# Patient Record
Sex: FEMALE | Race: WHITE | Employment: UNEMPLOYED | URBAN - METROPOLITAN AREA
[De-identification: names, ages, dates, MRNs, and addresses within clinical notes are randomized per-mention and may not be internally consistent; named-entity substitution may affect disease eponyms.]

---

## 2024-09-30 NOTE — PROGRESS NOTES
Well Adult Note  Name: Prashanth Douglas Today’s Date: 10/1/2024   MRN: [id number] Sex: Female   Age: 34 y.o. Ethnicity: Non- / Non    : 1990 Race:  or [place]       Prashanth Douglas is here for a well adult exam.       Subjective       This patient is new to the practice and is here to establish care. The patients [place] .  We reviewed the patients allergies and current medications. We reviewed the patients medical, surgical and family history.     Acute issues:    Right side pain:  comes and goes. This week more frequent, pain increasing. Unsure if BM helps or hurts, unsure of pattern with eating, unsure if pain medication helped, had US done in past and was told gallbladder. Was in July.     LMP: 10 days ago.     Chronic issues:    N/a    Health Maintenance:    PAP:  has not had done recently offered gyn referral. She has one.     Specialists:     OBGYN:  scheduled this month.     Review of Systems   Constitutional:  Positive for activity change. Negative for appetite change, chills, diaphoresis, fatigue, fever and unexpected weight change.   HENT:  Negative for congestion, ear discharge, ear pain, hearing loss, nosebleeds, postnasal drip, rhinorrhea, sinus pressure, sinus pain, sneezing, sore throat, tinnitus, trouble swallowing and voice change.    Eyes:  Negative for photophobia, pain, discharge, redness and itching.   Respiratory:  Negative for cough, choking, chest tightness, shortness of breath, wheezing and stridor.    Cardiovascular:  Negative for chest pain, palpitations and leg swelling.   Gastrointestinal:  Positive for abdominal pain. Negative for blood in stool, constipation, diarrhea, nausea, rectal pain and vomiting.   Endocrine: Negative for cold intolerance, heat intolerance, polydipsia and polyuria.   Genitourinary:  Negative for difficulty urinating, dysuria, enuresis, flank pain, frequency, hematuria and

## 2024-10-01 ENCOUNTER — OFFICE VISIT (OUTPATIENT)
Dept: FAMILY MEDICINE CLINIC | Age: 34
End: 2024-10-01
Payer: COMMERCIAL

## 2024-10-01 VITALS
HEART RATE: 70 BPM | SYSTOLIC BLOOD PRESSURE: 96 MMHG | DIASTOLIC BLOOD PRESSURE: 63 MMHG | OXYGEN SATURATION: 97 % | WEIGHT: 149 LBS | HEIGHT: 62 IN | BODY MASS INDEX: 27.42 KG/M2

## 2024-10-01 DIAGNOSIS — Z13.29 SCREENING FOR THYROID DISORDER: ICD-10-CM

## 2024-10-01 DIAGNOSIS — Z75.8 LANGUAGE BARRIER: ICD-10-CM

## 2024-10-01 DIAGNOSIS — R10.11 RUQ PAIN: ICD-10-CM

## 2024-10-01 DIAGNOSIS — Z76.89 ESTABLISHING CARE WITH NEW DOCTOR, ENCOUNTER FOR: Primary | ICD-10-CM

## 2024-10-01 DIAGNOSIS — R10.9 RIGHT SIDED ABDOMINAL PAIN: ICD-10-CM

## 2024-10-01 DIAGNOSIS — Z00.00 PREVENTATIVE HEALTH CARE: ICD-10-CM

## 2024-10-01 DIAGNOSIS — Z60.3 LANGUAGE BARRIER: ICD-10-CM

## 2024-10-01 DIAGNOSIS — Z23 INFLUENZA VACCINE NEEDED: ICD-10-CM

## 2024-10-01 DIAGNOSIS — Z13.220 SCREENING CHOLESTEROL LEVEL: ICD-10-CM

## 2024-10-01 PROCEDURE — G8484 FLU IMMUNIZE NO ADMIN: HCPCS | Performed by: NURSE PRACTITIONER

## 2024-10-01 PROCEDURE — 99385 PREV VISIT NEW AGE 18-39: CPT | Performed by: NURSE PRACTITIONER

## 2024-10-01 PROCEDURE — 90661 CCIIV3 VAC ABX FR 0.5 ML IM: CPT | Performed by: NURSE PRACTITIONER

## 2024-10-01 SDOH — ECONOMIC STABILITY: FOOD INSECURITY: WITHIN THE PAST 12 MONTHS, THE FOOD YOU BOUGHT JUST DIDN'T LAST AND YOU DIDN'T HAVE MONEY TO GET MORE.: NEVER TRUE

## 2024-10-01 SDOH — SOCIAL STABILITY - SOCIAL INSECURITY: ACCULTURATION DIFFICULTY: Z60.3

## 2024-10-01 SDOH — ECONOMIC STABILITY: FOOD INSECURITY: WITHIN THE PAST 12 MONTHS, YOU WORRIED THAT YOUR FOOD WOULD RUN OUT BEFORE YOU GOT MONEY TO BUY MORE.: NEVER TRUE

## 2024-10-01 SDOH — ECONOMIC STABILITY: INCOME INSECURITY: HOW HARD IS IT FOR YOU TO PAY FOR THE VERY BASICS LIKE FOOD, HOUSING, MEDICAL CARE, AND HEATING?: NOT HARD AT ALL

## 2024-10-01 ASSESSMENT — ENCOUNTER SYMPTOMS
CHEST TIGHTNESS: 0
NAUSEA: 0
COUGH: 0
DIARRHEA: 0
TROUBLE SWALLOWING: 0
SORE THROAT: 0
SINUS PAIN: 0
CONSTIPATION: 0
SINUS PRESSURE: 0
COLOR CHANGE: 0
SHORTNESS OF BREATH: 0
CHOKING: 0
EYE REDNESS: 0
ABDOMINAL PAIN: 1
BACK PAIN: 0
VOICE CHANGE: 0
RECTAL PAIN: 0
RHINORRHEA: 0
STRIDOR: 0
PHOTOPHOBIA: 0
BLOOD IN STOOL: 0
EYE PAIN: 0
EYE ITCHING: 0
EYE DISCHARGE: 0
VOMITING: 0
WHEEZING: 0

## 2024-10-01 ASSESSMENT — PATIENT HEALTH QUESTIONNAIRE - PHQ9
4. FEELING TIRED OR HAVING LITTLE ENERGY: NOT AT ALL
2. FEELING DOWN, DEPRESSED OR HOPELESS: NOT AT ALL
SUM OF ALL RESPONSES TO PHQ QUESTIONS 1-9: 0
SUM OF ALL RESPONSES TO PHQ QUESTIONS 1-9: 0
1. LITTLE INTEREST OR PLEASURE IN DOING THINGS: NOT AT ALL
SUM OF ALL RESPONSES TO PHQ QUESTIONS 1-9: 0
6. FEELING BAD ABOUT YOURSELF - OR THAT YOU ARE A FAILURE OR HAVE LET YOURSELF OR YOUR FAMILY DOWN: NOT AT ALL
3. TROUBLE FALLING OR STAYING ASLEEP: NOT AT ALL
SUM OF ALL RESPONSES TO PHQ QUESTIONS 1-9: 0
5. POOR APPETITE OR OVEREATING: NOT AT ALL
7. TROUBLE CONCENTRATING ON THINGS, SUCH AS READING THE NEWSPAPER OR WATCHING TELEVISION: NOT AT ALL
8. MOVING OR SPEAKING SO SLOWLY THAT OTHER PEOPLE COULD HAVE NOTICED. OR THE OPPOSITE, BEING SO FIGETY OR RESTLESS THAT YOU HAVE BEEN MOVING AROUND A LOT MORE THAN USUAL: NOT AT ALL
10. IF YOU CHECKED OFF ANY PROBLEMS, HOW DIFFICULT HAVE THESE PROBLEMS MADE IT FOR YOU TO DO YOUR WORK, TAKE CARE OF THINGS AT HOME, OR GET ALONG WITH OTHER PEOPLE: NOT DIFFICULT AT ALL
SUM OF ALL RESPONSES TO PHQ9 QUESTIONS 1 & 2: 0
9. THOUGHTS THAT YOU WOULD BE BETTER OFF DEAD, OR OF HURTING YOURSELF: NOT AT ALL

## 2024-10-02 DIAGNOSIS — Z76.89 ESTABLISHING CARE WITH NEW DOCTOR, ENCOUNTER FOR: ICD-10-CM

## 2024-10-02 DIAGNOSIS — Z13.220 SCREENING CHOLESTEROL LEVEL: ICD-10-CM

## 2024-10-02 DIAGNOSIS — R10.9 RIGHT SIDED ABDOMINAL PAIN: ICD-10-CM

## 2024-10-02 DIAGNOSIS — Z13.29 SCREENING FOR THYROID DISORDER: ICD-10-CM

## 2024-10-02 DIAGNOSIS — Z60.3 LANGUAGE BARRIER: ICD-10-CM

## 2024-10-02 DIAGNOSIS — R10.11 RUQ PAIN: ICD-10-CM

## 2024-10-02 DIAGNOSIS — Z75.8 LANGUAGE BARRIER: ICD-10-CM

## 2024-10-02 DIAGNOSIS — Z00.00 PREVENTATIVE HEALTH CARE: ICD-10-CM

## 2024-10-02 LAB
ALBUMIN SERPL-MCNC: 4.4 G/DL (ref 3.4–5)
ALBUMIN/GLOB SERPL: 1.9 {RATIO} (ref 1.1–2.2)
ALP SERPL-CCNC: 64 U/L (ref 40–129)
ALT SERPL-CCNC: 19 U/L (ref 10–40)
ANION GAP SERPL CALCULATED.3IONS-SCNC: 12 MMOL/L (ref 3–16)
AST SERPL-CCNC: 21 U/L (ref 15–37)
BASOPHILS # BLD: 0.1 K/UL (ref 0–0.2)
BASOPHILS NFR BLD: 1.2 %
BILIRUB SERPL-MCNC: 0.4 MG/DL (ref 0–1)
BUN SERPL-MCNC: 10 MG/DL (ref 7–20)
CALCIUM SERPL-MCNC: 9.4 MG/DL (ref 8.3–10.6)
CHLORIDE SERPL-SCNC: 104 MMOL/L (ref 99–110)
CHOLEST SERPL-MCNC: 208 MG/DL (ref 0–199)
CO2 SERPL-SCNC: 23 MMOL/L (ref 21–32)
CREAT SERPL-MCNC: <0.5 MG/DL (ref 0.6–1.1)
CRP SERPL-MCNC: <3 MG/L (ref 0–5.1)
DEPRECATED RDW RBC AUTO: 13.8 % (ref 12.4–15.4)
EOSINOPHIL # BLD: 0.1 K/UL (ref 0–0.6)
EOSINOPHIL NFR BLD: 1.8 %
ERYTHROCYTE [SEDIMENTATION RATE] IN BLOOD BY WESTERGREN METHOD: 17 MM/HR (ref 0–20)
EST. AVERAGE GLUCOSE BLD GHB EST-MCNC: 91.1 MG/DL
GFR SERPLBLD CREATININE-BSD FMLA CKD-EPI: >90 ML/MIN/{1.73_M2}
GLUCOSE SERPL-MCNC: 77 MG/DL (ref 70–99)
HBA1C MFR BLD: 4.8 %
HCG UR QL: NEGATIVE
HCT VFR BLD AUTO: 38.1 % (ref 36–48)
HDLC SERPL-MCNC: 57 MG/DL (ref 40–60)
HGB BLD-MCNC: 12.4 G/DL (ref 12–16)
LDLC SERPL CALC-MCNC: 136 MG/DL
LIPASE SERPL-CCNC: 30 U/L (ref 13–60)
LYMPHOCYTES # BLD: 1.2 K/UL (ref 1–5.1)
LYMPHOCYTES NFR BLD: 25.9 %
MCH RBC QN AUTO: 29.1 PG (ref 26–34)
MCHC RBC AUTO-ENTMCNC: 32.5 G/DL (ref 31–36)
MCV RBC AUTO: 89.6 FL (ref 80–100)
MONOCYTES # BLD: 0.6 K/UL (ref 0–1.3)
MONOCYTES NFR BLD: 12 %
NEUTROPHILS # BLD: 2.8 K/UL (ref 1.7–7.7)
NEUTROPHILS NFR BLD: 59.1 %
PLATELET # BLD AUTO: 241 K/UL (ref 135–450)
PMV BLD AUTO: 9.3 FL (ref 5–10.5)
POTASSIUM SERPL-SCNC: 4.4 MMOL/L (ref 3.5–5.1)
PROT SERPL-MCNC: 6.7 G/DL (ref 6.4–8.2)
RBC # BLD AUTO: 4.25 M/UL (ref 4–5.2)
SODIUM SERPL-SCNC: 139 MMOL/L (ref 136–145)
T4 FREE SERPL-MCNC: 1.1 NG/DL (ref 0.9–1.8)
TRIGL SERPL-MCNC: 75 MG/DL (ref 0–150)
TSH SERPL DL<=0.005 MIU/L-ACNC: 3.01 UIU/ML (ref 0.27–4.2)
VLDLC SERPL CALC-MCNC: 15 MG/DL
WBC # BLD AUTO: 4.7 K/UL (ref 4–11)

## 2024-10-02 SDOH — SOCIAL STABILITY - SOCIAL INSECURITY: ACCULTURATION DIFFICULTY: Z60.3

## 2024-10-03 DIAGNOSIS — R10.11 RUQ PAIN: ICD-10-CM

## 2024-10-03 DIAGNOSIS — R10.9 RIGHT SIDED ABDOMINAL PAIN: Primary | ICD-10-CM

## 2024-10-10 ENCOUNTER — HOSPITAL ENCOUNTER (OUTPATIENT)
Dept: ULTRASOUND IMAGING | Age: 34
Discharge: HOME | End: 2024-10-10
Payer: COMMERCIAL

## 2024-10-10 DIAGNOSIS — R10.9 RIGHT SIDED ABDOMINAL PAIN: ICD-10-CM

## 2024-10-10 DIAGNOSIS — R10.11 RUQ PAIN: ICD-10-CM

## 2024-10-10 PROCEDURE — 76700 US EXAM ABDOM COMPLETE: CPT

## 2024-11-21 ENCOUNTER — HOSPITAL ENCOUNTER (OUTPATIENT)
Dept: ENDOSCOPY | Age: 34
Setting detail: OUTPATIENT SURGERY
Discharge: HOME | End: 2024-11-23
Attending: INTERNAL MEDICINE
Payer: COMMERCIAL

## 2024-11-21 ENCOUNTER — ANESTHESIA (OUTPATIENT)
Dept: ENDOSCOPY | Age: 34
End: 2024-11-21
Payer: COMMERCIAL

## 2024-11-21 ENCOUNTER — HOSPITAL ENCOUNTER (OUTPATIENT)
Age: 34
Setting detail: OUTPATIENT SURGERY
Discharge: HOME | End: 2024-11-21
Attending: INTERNAL MEDICINE | Admitting: INTERNAL MEDICINE
Payer: COMMERCIAL

## 2024-11-21 ENCOUNTER — ANESTHESIA EVENT (OUTPATIENT)
Dept: ENDOSCOPY | Age: 34
End: 2024-11-21
Payer: COMMERCIAL

## 2024-11-21 VITALS
OXYGEN SATURATION: 99 % | WEIGHT: 140 LBS | HEART RATE: 61 BPM | BODY MASS INDEX: 25.76 KG/M2 | RESPIRATION RATE: 16 BRPM | HEIGHT: 62 IN | SYSTOLIC BLOOD PRESSURE: 102 MMHG | TEMPERATURE: 97.2 F | DIASTOLIC BLOOD PRESSURE: 64 MMHG

## 2024-11-21 DIAGNOSIS — R10.11 RIGHT UPPER QUADRANT ABDOMINAL PAIN: ICD-10-CM

## 2024-11-21 LAB — HCG UR QL: NEGATIVE

## 2024-11-21 PROCEDURE — 3700000000 HC ANESTHESIA ATTENDED CARE: Performed by: INTERNAL MEDICINE

## 2024-11-21 PROCEDURE — 88342 IMHCHEM/IMCYTCHM 1ST ANTB: CPT

## 2024-11-21 PROCEDURE — 88305 TISSUE EXAM BY PATHOLOGIST: CPT

## 2024-11-21 PROCEDURE — 3609012400 HC EGD TRANSORAL BIOPSY SINGLE/MULTIPLE: Performed by: INTERNAL MEDICINE

## 2024-11-21 PROCEDURE — 7100000011 HC PHASE II RECOVERY - ADDTL 15 MIN: Performed by: INTERNAL MEDICINE

## 2024-11-21 PROCEDURE — 2709999900 HC NON-CHARGEABLE SUPPLY: Performed by: INTERNAL MEDICINE

## 2024-11-21 PROCEDURE — 7100000010 HC PHASE II RECOVERY - FIRST 15 MIN: Performed by: INTERNAL MEDICINE

## 2024-11-21 PROCEDURE — 6360000002 HC RX W HCPCS: Performed by: NURSE ANESTHETIST, CERTIFIED REGISTERED

## 2024-11-21 PROCEDURE — 84703 CHORIONIC GONADOTROPIN ASSAY: CPT

## 2024-11-21 RX ORDER — SODIUM CHLORIDE 0.9 % (FLUSH) 0.9 %
5-40 SYRINGE (ML) INJECTION PRN
Status: DISCONTINUED | OUTPATIENT
Start: 2024-11-21 | End: 2024-11-21 | Stop reason: HOSPADM

## 2024-11-21 RX ORDER — SODIUM CHLORIDE 0.9 % (FLUSH) 0.9 %
5-40 SYRINGE (ML) INJECTION EVERY 12 HOURS SCHEDULED
Status: DISCONTINUED | OUTPATIENT
Start: 2024-11-21 | End: 2024-11-21 | Stop reason: HOSPADM

## 2024-11-21 RX ORDER — PROPOFOL 10 MG/ML
INJECTION, EMULSION INTRAVENOUS
Status: DISCONTINUED | OUTPATIENT
Start: 2024-11-21 | End: 2024-11-21 | Stop reason: SDUPTHER

## 2024-11-21 RX ORDER — MIDAZOLAM HYDROCHLORIDE 1 MG/ML
INJECTION, SOLUTION INTRAMUSCULAR; INTRAVENOUS
Status: DISCONTINUED | OUTPATIENT
Start: 2024-11-21 | End: 2024-11-21 | Stop reason: SDUPTHER

## 2024-11-21 RX ORDER — SODIUM CHLORIDE, SODIUM LACTATE, POTASSIUM CHLORIDE, CALCIUM CHLORIDE 600; 310; 30; 20 MG/100ML; MG/100ML; MG/100ML; MG/100ML
INJECTION, SOLUTION INTRAVENOUS CONTINUOUS
Status: DISCONTINUED | OUTPATIENT
Start: 2024-11-21 | End: 2024-11-21 | Stop reason: HOSPADM

## 2024-11-21 RX ORDER — PANTOPRAZOLE SODIUM 40 MG/1
40 TABLET, DELAYED RELEASE ORAL DAILY
COMMUNITY

## 2024-11-21 RX ORDER — LIDOCAINE HYDROCHLORIDE 10 MG/ML
1 INJECTION, SOLUTION EPIDURAL; INFILTRATION; INTRACAUDAL; PERINEURAL
Status: DISCONTINUED | OUTPATIENT
Start: 2024-11-21 | End: 2024-11-21 | Stop reason: HOSPADM

## 2024-11-21 RX ORDER — LIDOCAINE HYDROCHLORIDE 20 MG/ML
INJECTION, SOLUTION INTRAVENOUS
Status: DISCONTINUED | OUTPATIENT
Start: 2024-11-21 | End: 2024-11-21 | Stop reason: SDUPTHER

## 2024-11-21 RX ADMIN — PROPOFOL 40 MG: 10 INJECTION, EMULSION INTRAVENOUS at 12:58

## 2024-11-21 RX ADMIN — MIDAZOLAM HYDROCHLORIDE 2 MG: 2 INJECTION, SOLUTION INTRAMUSCULAR; INTRAVENOUS at 12:46

## 2024-11-21 RX ADMIN — LIDOCAINE HYDROCHLORIDE 50 MG: 20 INJECTION, SOLUTION INTRAVENOUS at 12:49

## 2024-11-21 RX ADMIN — PROPOFOL 100 MG: 10 INJECTION, EMULSION INTRAVENOUS at 12:52

## 2024-11-21 ASSESSMENT — PAIN - FUNCTIONAL ASSESSMENT: PAIN_FUNCTIONAL_ASSESSMENT: 0-10

## 2024-11-21 ASSESSMENT — PAIN SCALES - GENERAL: PAINLEVEL_OUTOF10: 0

## 2024-11-21 NOTE — PROCEDURES
PROCEDURE NOTE  Date: 2024   Name: Prashanth Douglas  YOB: 1990    Procedures  EGD, biopsy                Endoscopy Note    Patient: Prashanth Douglas  : 1990  Acct#:     Procedure: Esophagogastroduodenoscopy with biopsy    Date:  2024     Surgeon:  Brenton Gonzalez MD,     Referring Physician:  Nika Oconnell, APRN - CNP      Preoperative Diagnosis:    34-year-old female presents with epigastric/right upper quadrant pain.  Ultrasound abdominal was unremarkable      Anesthesia: MAC anesthesia      Consent:  The patient or their legal guardian has signed an informed consent, and is aware of the potential risks, benefits, alternatives, and potential complications of this procedure.  These include, but are not limited to hemorrhage, bleeding, post procedural pain, perforation, phlebitis, aspiration, hypotension, hypoxia, cardiovascular events such as arryhthmia, and possibly death.     Description of Procedure: The patient was then taken to the endoscopy suite, placed in the left lateral decubitus position and the above IV sedation was administrered.  The Olympus video endoscope was placed through the patient's oropharynx without difficulty to the extent of the 2nd portion of the duodenum. The patient tolerated the procedure well and was taken to the post anesthesia care unit in good condition.    Complications: None  EBL: none      Findings:  Esophagus:  Visualization of the esophagus demonstrated normal mucosa.  GE junction normal at 38 cm.     Stomach:  The scope was then advanced into the stomach.  Both forward and retroflexed views of the stomach were obtained.  Visualization of the gastric body and antrum demonstrated normal-appearing mucosa, biopsy obtained.  A retroflexed exam of the gastric cardia and fundus demonstrated normal mucosa.  The pylorus was patent and the scope was advanced into the duodenum.  Duodenum: Visualization of the duodenal bulb and the

## 2024-11-21 NOTE — ANESTHESIA PRE PROCEDURE
intravenous.      Anesthetic plan and risks discussed with patient.      Plan discussed with CRNA.                    Anish Graham MD   11/21/2024

## 2024-11-21 NOTE — DISCHARGE INSTRUCTIONS
ENDOSCOPY DISCHARGE INSTRUCTIONS:    Call the physician that did your procedure for any questions or concern:    [facility]: [phone]  DR. BENITEZ SHER        ACTIVITY:    There are potential side effects to the medications used for sedation and anesthesia during your procedure.  These include:  Dizziness or light-headedness, confusion or memory loss, delayed reaction times, loss of coordination, nausea and vomiting.  Because of your increased risk for injury, we ask that you observe the following precautions:  For the next 24 hours,  DO NOT operate an automobile, bicycle, motorcycle, , power tools or large equipment of any kind.  Do not drink alcohol, sign any legal documents or make any legal decisions for 24 hours.  Do not bend your head over lower than your heart.  DO sit on the side of bed/couch awhile before getting up.  Plan on bedrest or quiet relaxation today.  You may resume normal activities in 24 hours.    DIET:    Your first meal today should be light, avoiding spicy and fatty foods.  If you tolerate this first meal, then you may advance to your regular diet unless otherwise advised by your physician.    NORMAL SYMPTOMS:  -Mild sore throat if you’ve had an EGD   -Gaseous discomfort    NOTIFY YOUR PHYSICIAN IF THESE SYMPTOMS OCCUR:  1. Fever (greater than 100)  5. Increased abdominal bloating  2. Severe pain    6. Excessive bleeding  3. Nausea and vomiting  7. Chest pain                                                                    4. Chills    8. Shortness of breath    ADDITIONAL INSTRUCTIONS:    Biopsy results: Call [facility] for biopsy results in 1 week    Educational Information:    Impression:    Unremarkable EGD exam        Recommendations:   Follow pathology results  Continue pantoprazole  Follow-up in the office in 6 to 8 weeks/as needed      Please review these discharge instructions this evening or tomorrow for  information you may have forgotten.            We

## 2024-11-21 NOTE — PROGRESS NOTES
Ambulatory Surgery/Procedure Discharge Note    Vitals:    11/21/24 1353   BP: 102/64   Pulse: 61   Resp: 16   Temp:    SpO2: 99%   Discharge criteria met per Chano score. Dr. Gonzalez saw patient and  after procedure.     In: 120 [P.O.:120]  Out: -     Restroom use offered before discharge.  Yes    Pain assessment:  none  Pain Level: 0    Patient discharged to home/self care. Patient discharged via wheel chair by transporter to waiting family/S.O.       11/21/2024 1:57 PMPt and S.O./family states \"ready to go home\". Pt alert and oriented x4. IV removed. Denies N/V or pain. Pt tolerating po intake. Discharge instructions given to pt and  with pt permission.  used. Pt and  verbalized understanding of all instructions. Left with all belongings and discharge instructions.

## 2024-11-21 NOTE — ANESTHESIA POSTPROCEDURE EVALUATION
Department of Anesthesiology  Postprocedure Note    Patient: Prashanth Douglas  MRN: [id number]  YOB: 1990  Date of evaluation: 11/21/2024    Procedure Summary       Date: 11/21/24 Room / Location: [place] [address] / [facility]    Anesthesia Start: 1246 Anesthesia Stop: 1303    Procedure: ESOPHAGOGASTRODUODENOSCOPY BIOPSY Diagnosis:       Right upper quadrant abdominal pain      (Right upper quadrant abdominal pain [R10.11])    Surgeons: Brenton Gonzalez MD Responsible Provider: Anish Graham MD    Anesthesia Type: MAC ASA Status: 1            Anesthesia Type: No value filed.    Chano Phase I: Chano Score: 10    Chano Phase II: Chano Score: 10    Anesthesia Post Evaluation    Patient location during evaluation: PACU  Patient participation: complete - patient participated  Level of consciousness: awake and alert  Airway patency: patent  Nausea & Vomiting: no nausea and no vomiting  Cardiovascular status: blood pressure returned to baseline  Respiratory status: acceptable  Hydration status: euvolemic    No notable events documented.

## 2024-11-21 NOTE — CONSULTS
Session ID: [id number]  Language: Neptali   ID: #[id number]   Name: Wisamge: Neptali   ID: #[id number]   Name: Tiffani

## 2024-11-21 NOTE — H&P
Pre-operative History and Physical    Patient: Prashanth Douglas  : 1990  Acct#:     History Obtained From:  patient    HISTORY OF PRESENT ILLNESS:    The patient is a 34 y.o. female who presents with epigastric/right upper quadrant pain    Past Medical History:    History reviewed. No pertinent past medical history.  Past Surgical History:        Procedure Laterality Date     SECTION      x 2     Medications Prior to Admission:   No current facility-administered medications on file prior to encounter.     Current Outpatient Medications on File Prior to Encounter   Medication Sig Dispense Refill    pantoprazole (PROTONIX) 40 MG tablet Take 1 tablet by mouth daily       Allergies:  Patient has no known allergies.    History of allergic reaction to anesthesia:  No      PHYSICAL EXAM:      BP (!) 106/52   Pulse 73   Temp 97.7 °F (36.5 °C) (Temporal)   Resp 14   Ht 1.575 m (5' 2\")   Wt 63.5 kg (140 lb)   SpO2 100%   BMI 25.61 kg/m²  I        Heart:  heart sounds normal  Lungs: Breath sounds clear  Abdomen:  soft, non tender      ASA Grade:  ASA 2 - Patient with mild systemic disease with no functional limitations      ASSESSMENT AND PLAN:    1.  Patient is a 34 y.o. female here for EGD with deep sedation  2.  Procedure options, risks and benefits reviewed with patient.  Patient expresses understanding.          Antony Gonzalez MD  [facility]  [phone]

## 2024-12-02 NOTE — PROGRESS NOTES
Chief Complaint   Patient presents with    Rash     Welts on legs 4 days ABX given by Gastro        BP 96/62   Pulse 74   Ht 1.575 m (5' 2\")   Wt 69.9 kg (154 lb)   SpO2 98%   BMI 28.17 kg/m²     HPI:  Prashanth Douglas is a 34 y.o. (: 1990) here today   for   HPI    Patient's medications, allergies, past medical, surgical, social and family histories were reviewed and updated as appropriate.    Rash/skin lesions : on bilateral shins, red, slightly swollen, hard a little slightly itching, first noticed them 4 days ago. Hard under surface. Will get US and refer to derm. She is on an ab from GI, Clarithromycin and biaxin.         ROS:  Review of Systems   Constitutional:  Negative for activity change, appetite change, chills, diaphoresis, fatigue, fever and unexpected weight change.   HENT:  Negative for congestion, ear discharge, ear pain, hearing loss, nosebleeds, postnasal drip, rhinorrhea, sinus pressure, sinus pain, sneezing, sore throat, tinnitus, trouble swallowing and voice change.    Eyes:  Negative for photophobia, pain, discharge, redness and itching.   Respiratory:  Negative for cough, choking, chest tightness, shortness of breath, wheezing and stridor.    Cardiovascular:  Negative for chest pain, palpitations and leg swelling.   Gastrointestinal:  Negative for abdominal pain, blood in stool, constipation, diarrhea, nausea and vomiting.   Endocrine: Negative for cold intolerance, heat intolerance, polydipsia and polyuria.   Genitourinary:  Negative for difficulty urinating, dysuria, enuresis, flank pain, frequency, hematuria and urgency.   Musculoskeletal:  Negative for back pain, gait problem, joint swelling, neck pain and neck stiffness.   Skin:  Positive for color change and rash. Negative for pallor and wound.   Allergic/Immunologic: Negative for environmental allergies and food allergies.   Neurological:  Negative for dizziness, tremors, syncope, speech difficulty, weakness,

## 2024-12-03 ENCOUNTER — OFFICE VISIT (OUTPATIENT)
Dept: FAMILY MEDICINE CLINIC | Age: 34
End: 2024-12-03
Payer: COMMERCIAL

## 2024-12-03 VITALS
SYSTOLIC BLOOD PRESSURE: 96 MMHG | OXYGEN SATURATION: 98 % | WEIGHT: 154 LBS | DIASTOLIC BLOOD PRESSURE: 62 MMHG | BODY MASS INDEX: 28.34 KG/M2 | HEART RATE: 74 BPM | HEIGHT: 62 IN

## 2024-12-03 DIAGNOSIS — Z60.3 LANGUAGE BARRIER: ICD-10-CM

## 2024-12-03 DIAGNOSIS — Z75.8 LANGUAGE BARRIER: ICD-10-CM

## 2024-12-03 DIAGNOSIS — L98.9 SKIN LESIONS: Primary | ICD-10-CM

## 2024-12-03 PROCEDURE — G8427 DOCREV CUR MEDS BY ELIG CLIN: HCPCS | Performed by: NURSE PRACTITIONER

## 2024-12-03 PROCEDURE — G8484 FLU IMMUNIZE NO ADMIN: HCPCS | Performed by: NURSE PRACTITIONER

## 2024-12-03 PROCEDURE — 1036F TOBACCO NON-USER: CPT | Performed by: NURSE PRACTITIONER

## 2024-12-03 PROCEDURE — G8419 CALC BMI OUT NRM PARAM NOF/U: HCPCS | Performed by: NURSE PRACTITIONER

## 2024-12-03 PROCEDURE — 99213 OFFICE O/P EST LOW 20 MIN: CPT | Performed by: NURSE PRACTITIONER

## 2024-12-03 RX ORDER — CLARITHROMYCIN 500 MG/1
500 TABLET ORAL 2 TIMES DAILY
COMMUNITY
Start: 2024-11-25

## 2024-12-03 RX ORDER — AMOXICILLIN 500 MG/1
500 CAPSULE ORAL 2 TIMES DAILY
COMMUNITY
Start: 2024-11-25

## 2024-12-03 SDOH — SOCIAL STABILITY - SOCIAL INSECURITY: ACCULTURATION DIFFICULTY: Z60.3

## 2024-12-03 ASSESSMENT — ENCOUNTER SYMPTOMS
VOICE CHANGE: 0
CHOKING: 0
COLOR CHANGE: 1
VOMITING: 0
EYE REDNESS: 0
COUGH: 0
SORE THROAT: 0
PHOTOPHOBIA: 0
EYE DISCHARGE: 0
BLOOD IN STOOL: 0
CONSTIPATION: 0
STRIDOR: 0
SINUS PAIN: 0
SINUS PRESSURE: 0
NAUSEA: 0
TROUBLE SWALLOWING: 0
SHORTNESS OF BREATH: 0
RHINORRHEA: 0
DIARRHEA: 0
EYE PAIN: 0
EYE ITCHING: 0
BACK PAIN: 0
ABDOMINAL PAIN: 0
CHEST TIGHTNESS: 0
WHEEZING: 0

## 2024-12-06 ENCOUNTER — HOSPITAL ENCOUNTER (OUTPATIENT)
Dept: ULTRASOUND IMAGING | Age: 34
Discharge: HOME | End: 2024-12-06
Payer: COMMERCIAL

## 2024-12-06 DIAGNOSIS — L98.9 SKIN LESIONS: ICD-10-CM

## 2024-12-06 PROCEDURE — 76999 ECHO EXAMINATION PROCEDURE: CPT

## 2024-12-09 ENCOUNTER — OFFICE VISIT (OUTPATIENT)
Dept: FAMILY MEDICINE CLINIC | Age: 34
End: 2024-12-09
Payer: COMMERCIAL

## 2024-12-09 VITALS
OXYGEN SATURATION: 97 % | WEIGHT: 152 LBS | BODY MASS INDEX: 27.97 KG/M2 | SYSTOLIC BLOOD PRESSURE: 98 MMHG | HEART RATE: 94 BPM | DIASTOLIC BLOOD PRESSURE: 64 MMHG | HEIGHT: 62 IN

## 2024-12-09 DIAGNOSIS — L98.9 SKIN LESIONS: Primary | ICD-10-CM

## 2024-12-09 DIAGNOSIS — Z75.8 LANGUAGE BARRIER: ICD-10-CM

## 2024-12-09 DIAGNOSIS — Z60.3 LANGUAGE BARRIER: ICD-10-CM

## 2024-12-09 DIAGNOSIS — L98.9 SKIN LESIONS: ICD-10-CM

## 2024-12-09 DIAGNOSIS — M25.59 PAIN IN OTHER JOINT: ICD-10-CM

## 2024-12-09 LAB
CRP SERPL-MCNC: 5.3 MG/L (ref 0–5.1)
URATE SERPL-MCNC: 2.8 MG/DL (ref 2.6–6)

## 2024-12-09 PROCEDURE — G8419 CALC BMI OUT NRM PARAM NOF/U: HCPCS | Performed by: NURSE PRACTITIONER

## 2024-12-09 PROCEDURE — 1036F TOBACCO NON-USER: CPT | Performed by: NURSE PRACTITIONER

## 2024-12-09 PROCEDURE — G8484 FLU IMMUNIZE NO ADMIN: HCPCS | Performed by: NURSE PRACTITIONER

## 2024-12-09 PROCEDURE — G8427 DOCREV CUR MEDS BY ELIG CLIN: HCPCS | Performed by: NURSE PRACTITIONER

## 2024-12-09 PROCEDURE — 99213 OFFICE O/P EST LOW 20 MIN: CPT | Performed by: NURSE PRACTITIONER

## 2024-12-09 SDOH — SOCIAL STABILITY - SOCIAL INSECURITY: ACCULTURATION DIFFICULTY: Z60.3

## 2024-12-09 ASSESSMENT — ENCOUNTER SYMPTOMS
DIARRHEA: 0
CHOKING: 0
COLOR CHANGE: 1
EYE PAIN: 0
BACK PAIN: 0
SINUS PAIN: 0
NAUSEA: 0
CONSTIPATION: 0
STRIDOR: 0
COUGH: 0
BLOOD IN STOOL: 0
SINUS PRESSURE: 0
EYE DISCHARGE: 0
VOICE CHANGE: 0
VOMITING: 0
CHEST TIGHTNESS: 0
ABDOMINAL PAIN: 0
PHOTOPHOBIA: 0
RHINORRHEA: 0
SORE THROAT: 0
WHEEZING: 0
TROUBLE SWALLOWING: 0
EYE ITCHING: 0
EYE REDNESS: 0
SHORTNESS OF BREATH: 0

## 2024-12-09 NOTE — PROGRESS NOTES
No chief complaint on file.      BP 98/64   Pulse 94   Ht 1.575 m (5' 2\")   Wt 68.9 kg (152 lb)   SpO2 97%   BMI 27.80 kg/m²     HPI:  Prashanth Douglas is a 34 y.o. (: 1990) here today   for   HPI     used.     Patient's medications, allergies, past medical, surgical, social and family histories were reviewed and updated as appropriate.    Pt wanted to review US results.     Leg lesions: US negative, recommended follow up with Derm. Referral placed prior. Will reprint. She was taking magnesium but I dont believe this is the cause. She is getting new lesions. Will get autoimmune labs. Lesions are tender to touch. Pt has been having joint pain prior to lesions appearing. Will get labs.     ROS:  Review of Systems   Constitutional:  Negative for activity change, appetite change, chills, diaphoresis, fatigue, fever and unexpected weight change.   HENT:  Negative for congestion, ear discharge, ear pain, hearing loss, nosebleeds, postnasal drip, rhinorrhea, sinus pressure, sinus pain, sneezing, sore throat, tinnitus, trouble swallowing and voice change.    Eyes:  Negative for photophobia, pain, discharge, redness and itching.   Respiratory:  Negative for cough, choking, chest tightness, shortness of breath, wheezing and stridor.    Cardiovascular:  Negative for chest pain, palpitations and leg swelling.   Gastrointestinal:  Negative for abdominal pain, blood in stool, constipation, diarrhea, nausea and vomiting.   Endocrine: Negative for cold intolerance, heat intolerance, polydipsia and polyuria.   Genitourinary:  Negative for difficulty urinating, dysuria, enuresis, flank pain, frequency, hematuria and urgency.   Musculoskeletal:  Negative for back pain, gait problem, joint swelling, neck pain and neck stiffness.   Skin:  Positive for color change. Negative for pallor, rash and wound.        Hard lesions   Allergic/Immunologic: Negative for environmental allergies and food allergies.

## 2024-12-10 ENCOUNTER — OFFICE VISIT (OUTPATIENT)
Age: 34
End: 2024-12-10
Payer: COMMERCIAL

## 2024-12-10 VITALS
BODY MASS INDEX: 27.59 KG/M2 | OXYGEN SATURATION: 100 % | SYSTOLIC BLOOD PRESSURE: 92 MMHG | TEMPERATURE: 98.2 F | WEIGHT: 149.91 LBS | DIASTOLIC BLOOD PRESSURE: 56 MMHG | HEART RATE: 74 BPM | HEIGHT: 62 IN

## 2024-12-10 DIAGNOSIS — R22.42 NODULE OF SKIN OF LEFT LOWER EXTREMITY: Primary | ICD-10-CM

## 2024-12-10 LAB
ANA SER QL IA: NEGATIVE
ERYTHROCYTE [SEDIMENTATION RATE] IN BLOOD BY WESTERGREN METHOD: 15 MM/HR (ref 0–20)

## 2024-12-10 PROCEDURE — G8419 CALC BMI OUT NRM PARAM NOF/U: HCPCS | Performed by: SURGERY

## 2024-12-10 PROCEDURE — 99203 OFFICE O/P NEW LOW 30 MIN: CPT | Performed by: SURGERY

## 2024-12-10 PROCEDURE — G8427 DOCREV CUR MEDS BY ELIG CLIN: HCPCS | Performed by: SURGERY

## 2024-12-10 PROCEDURE — 1036F TOBACCO NON-USER: CPT | Performed by: SURGERY

## 2024-12-10 PROCEDURE — G8484 FLU IMMUNIZE NO ADMIN: HCPCS | Performed by: SURGERY

## 2024-12-10 NOTE — PROGRESS NOTES
Subjective   Patient ID: Prashanth Douglas is a 34 y.o. female.    HPI  Chief Complaint   Patient presents with    New Patient     Skin lesions on legs     Patient referred by Nika Oconnell CNP for evaluation of leg nodules. Patient reports symptoms of pain, heaviness, swelling. Location of symptoms is BLE. Symptoms were first noted one week ago.   Alleviated by Motrin.  She put Betadine on it yesterday without relief.  Symptoms aggravated by prolonged activity as worse at the end of the day.  Denies fever, chills, viral symptoms better, insect bite. CRP only minimally elevated at 5.3.  Also reports pain and heaviness in the bilateral upper extremities.  Previous evaluation includes ultrasound that was unremarkable as well.  On review of chart she was started on abx per GI as well but appears to be for H. pylori.  Patient has no significant medical or surgical history. Will plan following treatment: compression/elevation.        No past medical history on file.    Past Surgical History:   Procedure Laterality Date     SECTION      x 2    UPPER GASTROINTESTINAL ENDOSCOPY N/A 2024    ESOPHAGOGASTRODUODENOSCOPY BIOPSY performed by Brenton Gonzalez MD at [facility] ENDOSCOPY       Current Outpatient Medications   Medication Sig Dispense Refill    pantoprazole (PROTONIX) 40 MG tablet Take 1 tablet by mouth daily       No current facility-administered medications for this visit.       Prior to Admission medications    Medication Sig Start Date End Date Taking? Authorizing Provider   pantoprazole (PROTONIX) 40 MG tablet Take 1 tablet by mouth daily    Provider, MD Mina         No Known Allergies    Social History     Socioeconomic History    Marital status:      Spouse name: Not on file    Number of children: Not on file    Years of education: Not on file    Highest education level: Not on file   Occupational History    Not on file   Tobacco Use    Smoking status: Never    Smokeless

## 2024-12-11 ENCOUNTER — OFFICE VISIT (OUTPATIENT)
Dept: FAMILY MEDICINE CLINIC | Age: 34
End: 2024-12-11
Payer: COMMERCIAL

## 2024-12-11 VITALS
WEIGHT: 146 LBS | OXYGEN SATURATION: 97 % | HEART RATE: 78 BPM | BODY MASS INDEX: 26.7 KG/M2 | DIASTOLIC BLOOD PRESSURE: 60 MMHG | SYSTOLIC BLOOD PRESSURE: 100 MMHG

## 2024-12-11 DIAGNOSIS — Z60.3 LANGUAGE BARRIER: ICD-10-CM

## 2024-12-11 DIAGNOSIS — Z75.8 LANGUAGE BARRIER: ICD-10-CM

## 2024-12-11 DIAGNOSIS — M25.50 ARTHRALGIA, UNSPECIFIED JOINT: ICD-10-CM

## 2024-12-11 DIAGNOSIS — L98.9 SKIN LESION: Primary | ICD-10-CM

## 2024-12-11 PROCEDURE — G8484 FLU IMMUNIZE NO ADMIN: HCPCS | Performed by: NURSE PRACTITIONER

## 2024-12-11 PROCEDURE — 99213 OFFICE O/P EST LOW 20 MIN: CPT | Performed by: NURSE PRACTITIONER

## 2024-12-11 PROCEDURE — G8419 CALC BMI OUT NRM PARAM NOF/U: HCPCS | Performed by: NURSE PRACTITIONER

## 2024-12-11 PROCEDURE — 1036F TOBACCO NON-USER: CPT | Performed by: NURSE PRACTITIONER

## 2024-12-11 PROCEDURE — G8427 DOCREV CUR MEDS BY ELIG CLIN: HCPCS | Performed by: NURSE PRACTITIONER

## 2024-12-11 RX ORDER — CETIRIZINE HYDROCHLORIDE 10 MG/1
10 TABLET ORAL DAILY
Qty: 30 TABLET | Refills: 0 | Status: SHIPPED | OUTPATIENT
Start: 2024-12-11

## 2024-12-11 SDOH — SOCIAL STABILITY - SOCIAL INSECURITY: ACCULTURATION DIFFICULTY: Z60.3

## 2024-12-11 ASSESSMENT — ENCOUNTER SYMPTOMS
CONSTIPATION: 0
BLOOD IN STOOL: 0
ROS SKIN COMMENTS: SKIN LESIONS
DIARRHEA: 0
EYE DISCHARGE: 0
STRIDOR: 0
EYE ITCHING: 0
BACK PAIN: 0
SINUS PAIN: 0
NAUSEA: 0
WHEEZING: 0
VOMITING: 0
EYE REDNESS: 0
VOICE CHANGE: 0
TROUBLE SWALLOWING: 0
PHOTOPHOBIA: 0
CHEST TIGHTNESS: 0
SINUS PRESSURE: 0
ABDOMINAL PAIN: 0
EYE PAIN: 0
CHOKING: 0
COUGH: 0
SORE THROAT: 0
RHINORRHEA: 0
SHORTNESS OF BREATH: 0
COLOR CHANGE: 0

## 2024-12-11 NOTE — PROGRESS NOTES
Chief Complaint   Patient presents with    Follow-up     Go over test results.       used.     /60 (Site: Left Upper Arm)   Pulse 78   Wt 66.2 kg (146 lb)   SpO2 97%   BMI 26.70 kg/m²     HPI:  Prashanth Douglas is a 34 y.o. (: 1990) here today   for   HPI    Patient's medications, allergies, past medical, surgical, social and family histories were reviewed and updated as appropriate.    Lab results: Patient had 1 inflammatory lab positive but the rest were negative.  Uric acid negative as well    General surgeon: Discussed with general surgeon yesterday lesion seemed more bite-like.  Resolved significantly when he saw her in office yesterday.  Will continue to monitor.  He advised to watch for about a week and follow-up with me if they are not resolving.    Skin lesions: changing, darker and some new spots as well. Still hurts and tender. Starting to almost appear bruised. Lesion was hard now softer.         ROS:  Review of Systems   Constitutional:  Negative for activity change, appetite change, chills, diaphoresis, fatigue, fever and unexpected weight change.   HENT:  Negative for congestion, ear discharge, ear pain, hearing loss, nosebleeds, postnasal drip, rhinorrhea, sinus pressure, sinus pain, sneezing, sore throat, tinnitus, trouble swallowing and voice change.    Eyes:  Negative for photophobia, pain, discharge, redness and itching.   Respiratory:  Negative for cough, choking, chest tightness, shortness of breath, wheezing and stridor.    Cardiovascular:  Negative for chest pain, palpitations and leg swelling.   Gastrointestinal:  Negative for abdominal pain, blood in stool, constipation, diarrhea, nausea and vomiting.   Endocrine: Negative for cold intolerance, heat intolerance, polydipsia and polyuria.   Genitourinary:  Negative for difficulty urinating, dysuria, enuresis, flank pain, frequency, hematuria and urgency.   Musculoskeletal:  Negative for back pain, gait

## 2024-12-18 ENCOUNTER — OFFICE VISIT (OUTPATIENT)
Dept: FAMILY MEDICINE CLINIC | Age: 34
End: 2024-12-18

## 2024-12-18 VITALS
SYSTOLIC BLOOD PRESSURE: 96 MMHG | HEIGHT: 65 IN | OXYGEN SATURATION: 98 % | DIASTOLIC BLOOD PRESSURE: 64 MMHG | BODY MASS INDEX: 25.49 KG/M2 | WEIGHT: 153 LBS | HEART RATE: 77 BPM

## 2024-12-18 DIAGNOSIS — Z75.8 LANGUAGE BARRIER: ICD-10-CM

## 2024-12-18 DIAGNOSIS — M25.50 ARTHRALGIA, UNSPECIFIED JOINT: ICD-10-CM

## 2024-12-18 DIAGNOSIS — Z60.3 LANGUAGE BARRIER: ICD-10-CM

## 2024-12-18 DIAGNOSIS — L52 ERYTHEMA NODOSUM: Primary | ICD-10-CM

## 2024-12-18 DIAGNOSIS — M89.8X9 BONE PAIN: ICD-10-CM

## 2024-12-18 RX ORDER — METHYLPREDNISOLONE 4 MG/1
TABLET ORAL
Qty: 1 KIT | Refills: 0 | Status: SHIPPED | OUTPATIENT
Start: 2024-12-18 | End: 2024-12-18

## 2024-12-18 RX ORDER — METHYLPREDNISOLONE 4 MG/1
TABLET ORAL
Qty: 1 KIT | Refills: 0 | Status: SHIPPED | OUTPATIENT
Start: 2024-12-18 | End: 2024-12-24

## 2024-12-18 SDOH — SOCIAL STABILITY - SOCIAL INSECURITY: ACCULTURATION DIFFICULTY: Z60.3

## 2024-12-18 ASSESSMENT — ENCOUNTER SYMPTOMS
PHOTOPHOBIA: 0
BACK PAIN: 0
ABDOMINAL PAIN: 0
VOICE CHANGE: 0
COLOR CHANGE: 1
EYE PAIN: 0
EYE DISCHARGE: 0
COUGH: 0
CHEST TIGHTNESS: 0
TROUBLE SWALLOWING: 0
CONSTIPATION: 0
SINUS PAIN: 0
SINUS PRESSURE: 0
EYE ITCHING: 0
CHOKING: 0
VOMITING: 0
DIARRHEA: 0
BLOOD IN STOOL: 0
SORE THROAT: 0
SHORTNESS OF BREATH: 0
EYE REDNESS: 0
WHEEZING: 0
STRIDOR: 0
NAUSEA: 0
RHINORRHEA: 0

## 2024-12-18 NOTE — PROGRESS NOTES
barrier  -     Laure Guevara MD, [facility]  -     methylPREDNISolone (MEDROL DOSEPACK) 4 MG tablet; Take by mouth., Disp-1 kit, R-0Normal  3. Arthralgia, unspecified joint  -     Laure Guevara MD, Rheumatology, [place]  -     methylPREDNISolone (MEDROL DOSEPACK) 4 MG tablet; Take by mouth., Disp-1 kit, R-0Normal  4. Bone pain  -     Laure Guevara MD, Rheumatology, [facility]  -     methylPREDNISolone (MEDROL DOSEPACK) 4 MG tablet; Take by mouth., Disp-1 kit, R-0Normal     Follow up if symptoms do not improve or worsen. If the patient becomes short of breath go straight to the ER or call 911.

## (undated) DEVICE — FORCEPS BX L240CM JAW DIA2.4MM ORNG L CAP W/ NDL DISP RAD